# Patient Record
(demographics unavailable — no encounter records)

---

## 2017-01-09 RX ORDER — HYDROXYCHLOROQUINE SULFATE 200 MG/1
TABLET, FILM COATED ORAL
Qty: 60 TABLET | Refills: 0 | OUTPATIENT
Start: 2017-01-09

## 2017-02-24 RX ORDER — HYDROXYCHLOROQUINE SULFATE 200 MG/1
TABLET, FILM COATED ORAL
Qty: 60 TABLET | Refills: 1 | Status: SHIPPED | OUTPATIENT
Start: 2017-02-24

## 2017-02-24 NOTE — TELEPHONE ENCOUNTER
----- Message from Toño Peterson sent at 2/24/2017  8:37 AM CST -----  Contact: Amy- Sister   Refill on hydroxychloroquine (PLAQUENIL) 200 mg tablet    She states the patient is out of her medication, and need a refill as soon as possible. She would like someone to call her, regarding this matter.    Call: [phone]